# Patient Record
Sex: FEMALE | Race: WHITE | NOT HISPANIC OR LATINO | URBAN - METROPOLITAN AREA
[De-identification: names, ages, dates, MRNs, and addresses within clinical notes are randomized per-mention and may not be internally consistent; named-entity substitution may affect disease eponyms.]

---

## 2022-03-28 ENCOUNTER — APPOINTMENT (RX ONLY)
Dept: URBAN - METROPOLITAN AREA CLINIC 12 | Facility: CLINIC | Age: 52
Setting detail: DERMATOLOGY
End: 2022-03-28

## 2022-03-28 DIAGNOSIS — Z41.1 ENCOUNTER FOR COSMETIC SURGERY: ICD-10-CM

## 2022-03-28 PROCEDURE — ? CONSULTATION - AGING FACE

## 2022-03-28 PROCEDURE — ? PATIENT SPECIFIC COUNSELING

## 2022-03-28 NOTE — PROCEDURE: CONSULTATION - AGING FACE
Count Minor/Major Decisions Toward Mdm (Not Cosmetic)?: No
Consultation Charge $ (Use Numbers Only, No Text Please.): 125
Detail Level: Detailed

## 2022-03-28 NOTE — PROCEDURE: PATIENT SPECIFIC COUNSELING
Other (Free Text): Patient presents today for consultation on Aging Face, reports dissatisfaction with Face and Neck. Patient has history of cosmetic surgery(Breast Augmentation and Abdominoplasty), and allergies to sulfa drugs. Patient states her daughter is getting ready in Fall 2022. Previously had Botox 2 months ago.\\n\\Evi Sierra examined patient and discussed facial aging:\\nSun damage- moderate amount\\nSagging- moderate amount\\nVolume Loss- lost volume noted in lips, overall low volume loss\\n\\nNoted Jowls, and loss of volume to cheeks, loose platysmal band. Reports patient is a good candidate for Lower Face and Neck Lift, performed in the [facility].R. Under general anesthesia with overnight stay, recovery 10-14 days. Dr. Sierra recommended patient to consider plumping lips by adding strip of SMAS and threading into lips. Results will last 8-10 years. Also advised patient to do light chemical peels. Patient was shown before and after photos. Patients verbalized understanding and Daina will provide quote via email. RTC PRN
Detail Level: Simple